# Patient Record
Sex: FEMALE | Race: BLACK OR AFRICAN AMERICAN
[De-identification: names, ages, dates, MRNs, and addresses within clinical notes are randomized per-mention and may not be internally consistent; named-entity substitution may affect disease eponyms.]

---

## 2017-01-22 ENCOUNTER — HOSPITAL ENCOUNTER (INPATIENT)
Dept: HOSPITAL 74 - JER | Age: 47
LOS: 4 days | Discharge: HOME | DRG: 552 | End: 2017-01-26
Attending: INTERNAL MEDICINE | Admitting: INTERNAL MEDICINE
Payer: COMMERCIAL

## 2017-01-22 VITALS — BODY MASS INDEX: 31.7 KG/M2

## 2017-01-22 DIAGNOSIS — M51.26: Primary | ICD-10-CM

## 2017-01-22 DIAGNOSIS — E66.8: ICD-10-CM

## 2017-01-22 DIAGNOSIS — R26.2: ICD-10-CM

## 2017-01-22 DIAGNOSIS — M47.896: ICD-10-CM

## 2017-01-22 DIAGNOSIS — E11.9: ICD-10-CM

## 2017-01-22 DIAGNOSIS — I10: ICD-10-CM

## 2017-01-22 DIAGNOSIS — Z71.3: ICD-10-CM

## 2017-01-22 DIAGNOSIS — M54.16: ICD-10-CM

## 2017-01-22 LAB
BASOPHILS # BLD: 0.9 % (ref 0–2)
DEPRECATED RDW RBC AUTO: 18.6 % (ref 11.6–15.6)
EOSINOPHIL # BLD: 2.2 % (ref 0–4.5)
MCH RBC QN AUTO: 21.6 PG (ref 25.7–33.7)
MCHC RBC AUTO-ENTMCNC: 30.1 G/DL (ref 32–36)
MCV RBC: 71.6 FL (ref 80–96)
NEUTROPHILS # BLD: 67.6 % (ref 42.8–82.8)
PLATELET # BLD AUTO: 297 K/MM3 (ref 134–434)
PMV BLD: 10.3 FL (ref 7.5–11.1)
WBC # BLD AUTO: 11.7 K/MM3 (ref 4–10)

## 2017-01-22 PROCEDURE — G0378 HOSPITAL OBSERVATION PER HR: HCPCS

## 2017-01-22 NOTE — PDOC
History of Present Illness





- General


History Source: Patient





- History of Present Illness


Initial Comments: 





01/22/17 23:14


The patient is a 46 year old female with a significant past medical history of 

DM, HTN, kidney stones who is arrived to the emergency department via EMS with 

complaints of severe lower back pain radiating to lateral aspect of right thigh 

since 8pm tonight. Pt states that she started to experience a severe mid lower 

back pain, when she bent over in shower to wash her legs. Pt is able to move 

her legs, but she reports pain when lifting right leg. She denies any weakness, 

numbness, or tingling. She denies any urinary symptoms.


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


She denies fever, chills, abdominal pain, nausea, vomiting, diarrhea, chest pain

, SOB, cough, wheezing.


PCP: Dr. Maribel Suarez





<Codi Sorto - Last Filed: 01/23/17 02:01>





<Nargis Coy - Last Filed: 01/23/17 06:52>





- General


Chief Complaint: Back Pain


Stated Complaint: BACK PAIN


Time Seen by Provider: 01/22/17 21:45





Past History





<Codi Sorto - Last Filed: 01/23/17 02:01>





- Past Medical History


Diabetes: Yes


HTN: Yes


Kidney Stones: Yes





- Psycho/Social/Smoking Cessation Hx


Anxiety: No


Suicidal Ideation: No


Smoking Status: No


Smoking History: Never smoked


Years of Tobacco Use: 0


Have you smoked in the past 12 months: No


Number of Cigarettes Smoked Daily: 0


Information on smoking cessation initiated: No


Hx Alcohol Use: No


Drug/Substance Use Hx: No


Substance Use Type: None





<Nargis Coy - Last Filed: 01/23/17 06:52>





- Past Medical History


Allergies/Adverse Reactions: 


 Allergies











Allergy/AdvReac Type Severity Reaction Status Date / Time


 


No Known Allergies Allergy   Verified 01/22/17 21:37











Home Medications: 


Ambulatory Orders





Metformin Xr [Glucophage Xr] 750 mg PO BID 09/23/13 


Benzonatate [Tessalon Perle -] 200 mg PO BID #20 cap 01/24/16 


Lisinopril [Prinivil -] 10 mg PO DAILY 01/24/16 











**Review of Systems





- Review of Systems


Able to Perform ROS?: Yes


Comments:: 


01/22/17 23:14


GENERAL/CONSTITUTIONAL: No: fever, chills, weakness, loss of appetite.


HEAD, EYES, EARS, NOSE AND THROAT: No: change in vision, ear pain, discharge, 

sore throat, throat swelling.


CARDIOVASCULAR: No: chest pain, lightheadedness, palpitations, syncope


RESPIRATORY: No: cough, shortness of breath, wheezing, hemoptysis, stridor.


GASTROINTESTINAL: No: nausea, vomiting, abdominal cramping, diarrhea, rectal 

bleeding, constipation. 


GENITOURINARY: No: dysuria, hematuria, frequency, urgency, flank pain.


MUSCULOSKELETAL: Yes: lower back pain, left leg pain No:  neck pain, muscle 

swelling 


SKIN AND BREASTS: No: lesions, pallor, rash or easy bruising.


NEUROLOGIC: No: headache, vertigo, paresthesias, weakness 


ENDOCRINE: No: unexplained weight gain or loss


HEMATOLOGIC/LYMPHATIC: No: anemia, easy bleeding, swelling nodes


All Other Systems: Reviewed and Negative





<Codi Sorto - Last Filed: 01/23/17 02:01>





*Physical Exam





- Vital Signs


 Last Vital Signs











Temp Pulse Resp BP Pulse Ox


 


 98.3 F   80   20   150/83   97 


 


 01/22/17 21:40  01/22/17 21:40  01/22/17 21:40  01/22/17 21:40  01/22/17 21:40














- Physical Exam


Comments: 





01/22/17 23:15


GENERAL: The patient is in no acute distress.


HEAD: Normal with no signs of trauma.


EYES: PERRLA, EOMI, sclera anicteric, conjunctiva clear.


ENT: Ears normal, nares patent, oropharynx clear without exudates.  Moist 

mucous membranes.


NECK: Normal range of motion, supple without lymphadenopathy, JVD, or masses.


LUNGS: Breath sounds equal, clear to auscultation bilaterally.  No wheezes, and 

no crackles.


HEART:Regular rate and rhythm, normal S1 and S2 without murmur, rub or gallop.


ABDOMEN: Soft, nontender, normoactive bowel sounds.  No guarding, no rebound.


EXTREMITIES: Normal range of motion, no edema.  No clubbing or cyanosis. No 

erythema, or tenderness.


NEUROLOGICAL: Cranial nerves II through XII grossly intact.  Normal speech.  No 

focal 


neurological deficits. 


MUSCULOSKELETAL: +pain in lower back when raising left leg.  no CVA tenderness


SKIN: Warm, Dry, normal turgor, no rashes or lesions noted.





<Codi Sorto - Last Filed: 01/23/17 02:01>





- Vital Signs


 Last Vital Signs











Temp Pulse Resp BP Pulse Ox


 


 98.3 F   80   20   150/83   97 


 


 01/22/17 21:40  01/22/17 21:40  01/22/17 21:40  01/22/17 21:40  01/22/17 21:40














<Nargis Coy - Last Filed: 01/23/17 06:52>





ED Treatment Course





- LABORATORY


CBC & Chemistry Diagram: 


 01/22/17 23:34





 01/22/17 23:34





<Codi Sorto - Last Filed: 01/23/17 02:01>





- LABORATORY


CBC & Chemistry Diagram: 


 01/22/17 23:34





 01/22/17 23:34





- RADIOLOGY


Radiology Studies Ordered: 














 Category Date Time Status


 


 LUMBAR SPINE CT W/O CONTRAST [CT] Stat CT Scan  01/22/17 22:12 Ordered














<Nargis Coy - Last Filed: 01/23/17 06:52>





Medical Decision Making





- Medical Decision Making


01/23/17 01:53


Dr. Lara covering for Dr. Maribel Bynum was paged at [phone] 





01/23/17 02:01


case was discussed with Dr. Lara





<Codi Sorto - Last Filed: 01/23/17 02:01>





- Medical Decision Making


01/23/17 01:48


Patient Name: Bao Pelletier


THIS IS A PRELIMINARYREPORT FROM IMAGING ON CALL


EXAM: CT lumbar spine without contrast


IMAGES: 1057


EXAM DATE AND TIME: 2017-01-23 00:49:02.0


REASON FOR EXAM: Low back pain radiating to right thigh


COMPARISON: No


FINDINGS:


The lumbar vertebrae are normally aligned. No fracture.


Slight erosive changes of the inferior L4 and superior L5 endplate. May be 


degenerative in nature but followup recommended to make sure there is no 


infection. MR would be sensitive for this. No other appreciable bony 


degenerative changes.


Please note that large body habitus degrades the images to the point where it 


evaluation of the discs is not optimal. MRI would be more sensitive for disc 


evaluation..


At L4-5, there is a probable left paramedian disc herniation.


Mild hypertrophic changes left sacroiliac joint.


7.6 mm right renal stone is noted.


THIS DOCUMENT HAS BEEN ELECTRONICALLY SIGNED








01/23/17 06:51


Pt comes with a herniated disk after she injured herself in the shower tonight.

  She will be admitted, as she cannot ambulate secondary to the pain and she 

will likely require neuro consult and MRI in the AM.





<Nargis Coy - Last Filed: 01/23/17 06:52>





*DC/Admit/Observation/Transfer





- Attestations


Scribe Attestion: 





01/22/17 23:16





Documentation prepared by Codi Sorto, acting as medical scribe for Nargis Coy MD.





<Codi Sorto - Last Filed: 01/23/17 02:01>





- Discharge Dispostion


Admit: Yes





<Nargis Coy - Last Filed: 01/23/17 06:52>


Diagnosis at time of Disposition: 


 Herniated disc, Inability to walk





- Referrals

## 2017-01-23 LAB
ALBUMIN SERPL-MCNC: 3.8 G/DL (ref 3.4–5)
ALP SERPL-CCNC: 59 U/L (ref 45–117)
ALT SERPL-CCNC: 35 U/L (ref 12–78)
ANION GAP SERPL CALC-SCNC: 10 MMOL/L (ref 8–16)
ANISOCYTOSIS BLD QL SMEAR: (no result)
APPEARANCE UR: CLEAR
AST SERPL-CCNC: 31 U/L (ref 15–37)
BILIRUB SERPL-MCNC: 0.4 MG/DL (ref 0.2–1)
BILIRUB UR STRIP.AUTO-MCNC: NEGATIVE MG/DL
CALCIUM SERPL-MCNC: 8.9 MG/DL (ref 8.5–10.1)
CO2 SERPL-SCNC: 25 MMOL/L (ref 21–32)
COLOR UR: (no result)
CREAT SERPL-MCNC: 1 MG/DL (ref 0.55–1.02)
GLUCOSE SERPL-MCNC: 87 MG/DL (ref 74–106)
INR BLD: 1.1 (ref 0.82–1.09)
KETONES UR QL STRIP: NEGATIVE
LEUKOCYTE ESTERASE UR QL STRIP.AUTO: NEGATIVE
MACROCYTES BLD QL SMEAR: (no result)
NITRITE UR QL STRIP: NEGATIVE
PH UR: 6 [PH] (ref 5–8)
PLATELET # BLD EST: ADEQUATE 10*3/UL
PROT SERPL-MCNC: 8.1 G/DL (ref 6.4–8.2)
PROT UR QL STRIP: NEGATIVE
PROT UR QL STRIP: NEGATIVE
PT PNL PPP: 12.1 SEC (ref 9.98–11.88)
RBC # UR STRIP: NEGATIVE /UL
SP GR UR: 1.01 (ref 1–1.03)
UROBILINOGEN UR STRIP-MCNC: NEGATIVE E.U./DL (ref 0.2–1)

## 2017-01-23 RX ADMIN — CYCLOBENZAPRINE HYDROCHLORIDE SCH MG: 10 TABLET, FILM COATED ORAL at 10:06

## 2017-01-23 RX ADMIN — VALSARTAN SCH MG: 80 TABLET, FILM COATED ORAL at 10:06

## 2017-01-23 RX ADMIN — KETOROLAC TROMETHAMINE PRN MG: 15 INJECTION, SOLUTION INTRAMUSCULAR; INTRAVENOUS at 18:11

## 2017-01-23 RX ADMIN — CYCLOBENZAPRINE HYDROCHLORIDE SCH MG: 10 TABLET, FILM COATED ORAL at 23:27

## 2017-01-23 RX ADMIN — KETOROLAC TROMETHAMINE PRN MG: 15 INJECTION, SOLUTION INTRAMUSCULAR; INTRAVENOUS at 08:52

## 2017-01-23 RX ADMIN — INSULIN ASPART SCH: 100 INJECTION, SOLUTION INTRAVENOUS; SUBCUTANEOUS at 17:02

## 2017-01-23 NOTE — CONSULT
Consult - text type





- Consultation


Consultation Note: 


CC: Low back pain 





HPI: This is a 46 y woman who was admitted with an acute onset lower back pain. 

Her pain started after she was bending forward to lift an object. She denies 

any prior lower back pain. The pain is worse with bending forward and turning 

in bed. It limits her activity and sleep. \





PMH: DM, HTN, Nephrolithiasis





Radiology: Transitional anatomy as S1/S2 with L5/S1 being the lowest movable 

segment. Type II Modic changes at L5/S1 with mild effacement  with left S1 

lateral recess. 





A: 


1. Low back pain


2. Lumbar discogenic pain 





P:


1. I explained the MRI findings and the treatment plan to her. 


2. Schedule for Right L4, L5 TFESI tomorrow. I will coordinate it with the OR 

staff as it is going to be an add-on case. 


3. NPO tonight. 


4. Continue current mgmt for now. 


5. Thank you for the consult.

## 2017-01-23 NOTE — HP
Admitting History and Physical





- Primary Care Physician


PCP: Maribel Bynum





- Smoking History


Smoking history: Never smoked


Have you smoked in the past 12 months: No


Aproximately how many cigarettes per day: 0





- Alcohol/Substance Use


Hx Alcohol Use: No





<Sherin Lara - Last Filed: 01/23/17 08:15>





- Primary Care Physician


PCP: Maribel Bynum





- Admission


Chief Complaint: Back pain


History of Present Illness: 


The patient is a 46 year old female with a significant past medical history of 

DM, HTN, kidney stones who arrived to the emergency department via EMS with 

complaints of severe lower back pain radiating to lateral aspect of right thigh 

since 8pm tonight. Pt states that she started to experience a severe mid lower 

back pain, when she bent over in shower to wash her legs. Pt is able to move 

her legs, but she reports pain when lifting right leg. She denies any weakness, 

numbness, or tingling. She denies any urinary symptoms.





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





She denies fever, chills, abdominal pain, nausea, vomiting, diarrhea, chest pain

, SOB, cough, wheezing.





CT Scan done in ED showed possible L4-L5 disc herniation. 


Patient seen today in the ED.


Continues to have back pain.


Unable to walk.


Denies urinary or bowel trouble.


No headache.


No dizziness.


Denies any head trauma.


Denies any chest pain.








History Source: Patient


Limitations to Obtaining History: No Limitations





- Past Medical History


Cardiovascular: Yes: HTN


Endocrine: Yes: Diabetes Mellitus


Additional Past Medical History: 


obesity





<Danitza Pérez - Last Filed: 01/23/17 09:57>





Home Medications





<Sherin Lara - Last Filed: 01/23/17 08:15>





<Danitza Pérez - Last Filed: 01/23/17 09:57>





- Allergies


Allergies/Adverse Reactions: 


 Allergies











Allergy/AdvReac Type Severity Reaction Status Date / Time


 


No Known Allergies Allergy   Verified 01/22/17 21:37














- Home Medications


Home Medications: 


Ambulatory Orders





Metformin Xr [Glucophage Xr] 750 mg PO BID 09/23/13 


Benzonatate [Tessalon Perle -] 200 mg PO BID #20 cap 01/24/16 


Lisinopril [Prinivil -] 10 mg PO DAILY 01/24/16 











Family Disease History





- Family Disease History


Family History: Unremarkable





<Daintza Pérez - Last Filed: 01/23/17 09:57>





Review of Systems


Unable to obtain ROS, reason: See HPI





<Danitza Pérez - Last Filed: 01/23/17 09:57>





Physical Examination


Vital Signs: 


 Vital Signs











Temperature  98.2 F   01/23/17 06:28


 


Pulse Rate  67   01/23/17 06:28


 


Respiratory Rate  16   01/23/17 06:28


 


Blood Pressure  122/59   01/23/17 06:28


 


O2 Sat by Pulse Oximetry (%)  99   01/23/17 06:28














<LaraCinthyablainebrenda - Last Filed: 01/23/17 08:15>


Vital Signs: 


 Vital Signs











Temperature  98.2 F   01/23/17 06:28


 


Pulse Rate  67   01/23/17 06:28


 


Respiratory Rate  16   01/23/17 06:28


 


Blood Pressure  122/59   01/23/17 06:28


 


O2 Sat by Pulse Oximetry (%)  99   01/23/17 07:40











Constitutional: Yes: Mild Distress


Eyes: Yes: Conjunctiva Clear


HENT: Yes: WNL


Neck: Yes: Supple


Cardiovascular: Yes: Regular Rate and Rhythm


Respiratory: Yes: CTA Bilaterally


Gastrointestinal: Yes: Soft


Musculoskeletal: Yes: Back Pain, Other (Straight leg raising test positve at 70 

degrees.  DTR's within normal limit.)


Edema: No


Peripheral Pulses WNL: Yes


Neurological: Yes: Alert





<Danitza Pérez - Last Filed: 01/23/17 09:57>





Imaging





- Results


Cat Scan: Report Reviewed





<Danitza Pérez - Last Filed: 01/23/17 09:57>





Problem List





- Problems


(1) Herniated disc


Code(s): [id number] -    





(2) Inability to walk


Code(s): R26.2 - DIFFICULTY IN WALKING, NOT ELSEWHERE CLASSIFIED





(3) Hypertension


Code(s): I10 - ESSENTIAL (PRIMARY) HYPERTENSION   





(4) Diabetes


Code(s): E11.9 - TYPE 2 DIABETES MELLITUS WITHOUT COMPLICATIONS   





(5) Obesity


Code(s): E66.9 - OBESITY, UNSPECIFIED   








<Danitza Pérez - Last Filed: 01/23/17 09:57>





Assessment/Plan


Pain control.


CT scan reviewed.


Will order MRI of LS spine.


Add Flexeril.


Ice packs.


Physical therapy.


DVT Ppx.


Monitor BG and BP.


Weight reduction stressed.


Will follow.











Documentation prepared by Danitza Pérez, acting as a medical scribe for 

Sherin Lara MD.





<Danitza Pérez - Last Filed: 01/23/17 09:57>

## 2017-01-24 PROCEDURE — 3E0R33Z INTRODUCTION OF ANTI-INFLAMMATORY INTO SPINAL CANAL, PERCUTANEOUS APPROACH: ICD-10-PCS | Performed by: PHYSICAL MEDICINE & REHABILITATION

## 2017-01-24 RX ADMIN — INSULIN ASPART SCH: 100 INJECTION, SOLUTION INTRAVENOUS; SUBCUTANEOUS at 06:25

## 2017-01-24 RX ADMIN — KETOROLAC TROMETHAMINE PRN MG: 15 INJECTION, SOLUTION INTRAMUSCULAR; INTRAVENOUS at 20:47

## 2017-01-24 RX ADMIN — INSULIN ASPART SCH: 100 INJECTION, SOLUTION INTRAVENOUS; SUBCUTANEOUS at 17:15

## 2017-01-24 RX ADMIN — CYCLOBENZAPRINE HYDROCHLORIDE SCH: 10 TABLET, FILM COATED ORAL at 08:59

## 2017-01-24 RX ADMIN — VALSARTAN SCH: 80 TABLET, FILM COATED ORAL at 08:59

## 2017-01-24 RX ADMIN — KETOROLAC TROMETHAMINE PRN MG: 15 INJECTION, SOLUTION INTRAMUSCULAR; INTRAVENOUS at 08:19

## 2017-01-24 RX ADMIN — CYCLOBENZAPRINE HYDROCHLORIDE SCH MG: 10 TABLET, FILM COATED ORAL at 21:15

## 2017-01-24 NOTE — PN
Progress Note, Physician


Chief Complaint: 


Events noted


has back pain - radiating to right leg


no numbness


unable to participate in PT due to pain 


Seen by pain management 





- Current Medication List


Current Medications: 


Active Medications





Cyclobenzaprine HCl (Flexeril -)  10 mg PO BID [facility]


   Last Admin: 01/24/17 08:59 Dose:  Not Given


Docusate Sodium (Colace -)  100 mg PO Q8H PRN


   PRN Reason: CONSTIPATION


Insulin Aspart (Novolog Vial Sliding Scale -)  1 vial SQ BIDAC THU


   PRN Reason: Protocol


   Last Admin: 01/24/17 06:25 Dose:  Not Given


Ketorolac Tromethamine (Toradol Injection -)  15 mg IVPUSH Q6H PRN


   PRN Reason: PAIN


   Stop: 01/28/17 08:09


   Last Admin: 01/24/17 08:19 Dose:  15 mg


Non-Formulary Medication (Benzonatate [Tessalon Perle -])  200 mg PO BID [facility]


Ondansetron HCl (Zofran Injection)  4 mg IVPB Q6H PRN


   PRN Reason: NAUSEA


Valsartan (Diovan -)  80 mg PO DAILY [facility]


   Last Admin: 01/24/17 08:59 Dose:  Not Given











- Objective


Vital Signs: 


 Vital Signs











Temperature  98.0 F   01/24/17 08:00


 


Pulse Rate  77   01/24/17 08:00


 


Respiratory Rate  20   01/24/17 08:00


 


Blood Pressure  133/88   01/24/17 08:00


 


O2 Sat by Pulse Oximetry (%)  96   01/24/17 08:00











Constitutional: Yes: No Distress, Calm


Cardiovascular: Yes: Regular Rate and Rhythm


Respiratory: Yes: CTA Bilaterally


Gastrointestinal: Yes: Normal Bowel Sounds, Soft, Abdomen, Obese.  No: 

Distention, Tenderness


Edema: No


Neurological: Yes: WNL


...Motor Strength: WNL


Psychiatric: Yes: Alert, Oriented


Labs: 


 INR, PTT











INR  1.10  (0.82-1.09)   01/22/17  23:34    














- ....Imaging


MRI: Report Reviewed





Problem List





- Problems


(1) Diabetes


Code(s): E11.9 - TYPE 2 DIABETES MELLITUS WITHOUT COMPLICATIONS   Qualifiers: 


     Diabetes mellitus type: type 2     Diabetes mellitus complication status: 

without complication





(2) Herniated disc


Code(s): [id number] -    Qualifiers: 


     Spinal region: lumbar        Qualified Code(s): M51.26 - Other 

intervertebral disc displacement, lumbar region  





(3) Hypertension


Code(s): I10 - ESSENTIAL (PRIMARY) HYPERTENSION   Qualifiers: 


     Hypertension type: essential hypertension        Qualified Code(s): I10 - 

Essential (primary) hypertension  





(4) Obesity


Code(s): E66.9 - OBESITY, UNSPECIFIED   Qualifiers: 


     Obesity type: due to excess calories





(5) Lumbar radiculopathy


Code(s): M54.16 - RADICULOPATHY, LUMBAR REGION








Assessment/Plan


PLAN


-- scheduled for epidural injection today


-- pain meds as needed


-- will need PT 


-- hold off Lovenox today

## 2017-01-24 NOTE — PROC
Procedure Note


Procedure: 


Procedure Date: 01/24/17





Pre-operative Diagnosis : Lumbar spondylosis, Lumbar DDD, Lumbar Radiculopathy 





Post operative Diagnosis: same 





Procedure: Right L4, L5 Transforaminal epidural steroid injections under 

fluoroscopy





Anesthesia: MAC





EBL:  0cc 





After obtaining informed consent regarding risks, benefits and alternatives of 

the procedure from the patient, the procedure was commenced.





Patient placed prone, skin cleaned with Betadine, soft tissue anesthetized with 

1% lidocaine.





22 gauge spinal needles were advanced towards the 6o clock position of the 

pedicle(s) of the above mentioned level(s) using oblique approach and 

intermittent fluoroscopy. AP and lateral visualization was used to ensure 

proper needle placement. Contrast was injected and good selective epidurogram(s

) ~noted. Solution containing 1cc of Dexamethasone (10mg/ml) and 3cc of 0.25% 

Marcaine was injected at each level.





Needles were withdrawn. Patient tolerated the procedure well and was discharged 

home.

## 2017-01-25 RX ADMIN — CYCLOBENZAPRINE HYDROCHLORIDE SCH MG: 10 TABLET, FILM COATED ORAL at 21:39

## 2017-01-25 RX ADMIN — INSULIN ASPART SCH: 100 INJECTION, SOLUTION INTRAVENOUS; SUBCUTANEOUS at 16:50

## 2017-01-25 RX ADMIN — POLYETHYLENE GLYCOL 3350 SCH GRAMS: 17 POWDER, FOR SOLUTION ORAL at 17:02

## 2017-01-25 RX ADMIN — INSULIN ASPART SCH: 100 INJECTION, SOLUTION INTRAVENOUS; SUBCUTANEOUS at 06:46

## 2017-01-25 RX ADMIN — VALSARTAN SCH MG: 80 TABLET, FILM COATED ORAL at 10:19

## 2017-01-25 RX ADMIN — CYCLOBENZAPRINE HYDROCHLORIDE SCH MG: 10 TABLET, FILM COATED ORAL at 10:19

## 2017-01-25 RX ADMIN — GABAPENTIN SCH MG: 100 CAPSULE ORAL at 21:40

## 2017-01-25 RX ADMIN — GABAPENTIN SCH MG: 100 CAPSULE ORAL at 12:43

## 2017-01-25 RX ADMIN — KETOROLAC TROMETHAMINE PRN MG: 15 INJECTION, SOLUTION INTRAMUSCULAR; INTRAVENOUS at 10:19

## 2017-01-25 NOTE — PN
Progress Note (short form)





- Note


Progress Note: 


s/p epidural injection 


Pain is not severe as before but has pressure sensation top back when she moves


She has pain radiating to right leg


No numbness or weakness


 Vital Signs - 24 hr











  01/24/17 01/24/17 01/24/17





  13:53 15:19 17:00


 


Temperature 98.6 F 98.1 F 98.1 F


 


Pulse Rate 83 80 90


 


Respiratory  20 20





Rate   


 


Blood Pressure 130/75 151/84 131/70


 


O2 Sat by Pulse  96 





Oximetry (%)   














  01/25/17 01/25/17 01/25/17





  02:00 06:00 10:26


 


Temperature  97.9 F 97.5 F L


 


Pulse Rate  84 87


 


Respiratory 20 20 18





Rate   


 


Blood Pressure  128/74 133/72


 


O2 Sat by Pulse 96  





Oximetry (%)   








 Current Medications











Generic Name Dose Route Start Last Admin





  Trade Name Freq  PRN Reason Stop Dose Admin


 


Cyclobenzaprine HCl  10 mg 01/23/17 10:00 01/25/17 10:19





  Flexeril -  PO   10 mg





  BID THU   Administration


 


Docusate Sodium  100 mg 01/23/17 08:10  





  Colace -  PO   





  Q8H PRN   





  CONSTIPATION   


 


Gabapentin  100 mg 01/25/17 12:00  





  Neurontin -  PO   





  BID THU   


 


Insulin Aspart  1 vial 01/23/17 16:30 01/25/17 06:46





  Novolog Vial Sliding Scale -  SQ   Not Given





  BIDAC [facility]   





  Protocol   


 


Ketorolac Tromethamine  15 mg 01/23/17 08:10 01/25/17 10:19





  Toradol Injection -  IVPUSH 01/28/17 08:09  15 mg





  Q6H PRN   Administration





  PAIN   


 


Ondansetron HCl  4 mg 01/23/17 08:10  





  Zofran Injection  IVPB   





  Q6H PRN   





  NAUSEA   


 


Valsartan  80 mg 01/23/17 10:00 01/25/17 10:19





  Diovan -  PO   80 mg





  DAILY THU   Administration








 Laboratory Results - last 24 hr











  01/24/17 01/25/17





  16:24 06:20


 


POC Glucometer  83  192








S1 S2 RRR


Lungs clear


No spinal tenderness


no edema








PLAN


-- Add Gabapentin


-- Physical therapy


-- continue with meds


-- continue with Flexeril 


-- dc plan for tomorrow  





Problem List





- Problems


(1) Diabetes


Code(s): E11.9 - TYPE 2 DIABETES MELLITUS WITHOUT COMPLICATIONS   Qualifiers: 


     Diabetes mellitus type: type 2     Diabetes mellitus complication status: 

without complication





(2) Herniated disc


Code(s): [id number] -    Qualifiers: 


     Spinal region: lumbar        Qualified Code(s): M51.26 - Other 

intervertebral disc displacement, lumbar region  





(3) Hypertension


Code(s): I10 - ESSENTIAL (PRIMARY) HYPERTENSION   Qualifiers: 


     Hypertension type: essential hypertension        Qualified Code(s): I10 - 

Essential (primary) hypertension  





(4) Obesity


Code(s): E66.9 - OBESITY, UNSPECIFIED   Qualifiers: 


     Obesity type: due to excess calories





(5) Lumbar radiculopathy


Code(s): M54.16 - RADICULOPATHY, LUMBAR REGION

## 2017-01-26 VITALS — HEART RATE: 77 BPM

## 2017-01-26 VITALS — DIASTOLIC BLOOD PRESSURE: 69 MMHG | SYSTOLIC BLOOD PRESSURE: 122 MMHG | TEMPERATURE: 98.4 F

## 2017-01-26 RX ADMIN — CYCLOBENZAPRINE HYDROCHLORIDE SCH MG: 10 TABLET, FILM COATED ORAL at 10:01

## 2017-01-26 RX ADMIN — POLYETHYLENE GLYCOL 3350 SCH: 17 POWDER, FOR SOLUTION ORAL at 10:01

## 2017-01-26 RX ADMIN — INSULIN ASPART SCH: 100 INJECTION, SOLUTION INTRAVENOUS; SUBCUTANEOUS at 06:23

## 2017-01-26 RX ADMIN — GABAPENTIN SCH MG: 100 CAPSULE ORAL at 10:01

## 2017-01-26 RX ADMIN — VALSARTAN SCH MG: 80 TABLET, FILM COATED ORAL at 10:01

## 2017-01-26 NOTE — DS
Physical Examination


Vital Signs: 


 Vital Signs











Temperature  98.2 F   01/26/17 06:00


 


Pulse Rate  74   01/26/17 06:00


 


Respiratory Rate  20   01/26/17 06:00


 


Blood Pressure  133/79   01/26/17 06:00


 


O2 Sat by Pulse Oximetry (%)  96   01/25/17 10:00











Constitutional: Yes: No Distress, Calm


Cardiovascular: Yes: Regular Rate and Rhythm


Respiratory: Yes: CTA Bilaterally


Gastrointestinal: Yes: Normal Bowel Sounds, Soft, Abdomen, Obese.  No: 

Distention, Tenderness


Edema: No


Neurological: Yes: WNL, Alert, Oriented


...Motor Strength: WNL


Psychiatric: Yes: Alert, Oriented


Labs: 


 Laboratory Last Values











WBC  11.7 K/mm3 (4.0-10.0)  H  01/22/17  23:34    


 


RBC  4.67 M/mm3 (3.60-5.2)   01/22/17  23:34    


 


Hgb  10.1 GM/dL (10.7-15.3)  L  01/22/17  23:34    


 


Hct  33.5 % (32.4-45.2)   01/22/17  23:34    


 


MCV  71.6 fl (80-96)  L  01/22/17  23:34    


 


MCHC  30.1 g/dl (32.0-36.0)  L  01/22/17  23:34    


 


RDW  18.6 % (11.6-15.6)  H  01/22/17  23:34    


 


Plt Count  297 K/MM3 (134-434)   01/22/17  23:34    


 


MPV  10.3 fl (7.5-11.1)   01/22/17  23:34    


 


Neutrophils %  67.6 % (42.8-82.8)   01/22/17  23:34    


 


Lymphocytes %  22.3 % (8-40)   01/22/17  23:34    


 


Monocytes %  7.0 % (3.8-10.2)   01/22/17  23:34    


 


Eosinophils %  2.2 % (0-4.5)   01/22/17  23:34    


 


Basophils %  0.9 % (0-2.0)   01/22/17  23:34    


 


Platelet Estimate  Adequate  (NORMAL)   01/22/17  23:34    


 


Platelet Comment  Few large plts   01/22/17  23:34    


 


Anisocytosis  2+   01/22/17  23:34    


 


Macrocytosis  1+   01/22/17  23:34    


 


INR  1.10  (0.82-1.09)   01/22/17  23:34    


 


Sodium  138 mmol/L (136-145)   01/22/17  23:34    


 


Potassium  4.1 mmol/L (3.5-5.1)   01/22/17  23:34    


 


Chloride  103 mmol/L ()   01/22/17  23:34    


 


Carbon Dioxide  25 mmol/L (21-32)   01/22/17  23:34    


 


Anion Gap  10  (8-16)   01/22/17  23:34    


 


BUN  11 mg/dL (7-18)   01/22/17  23:34    


 


Creatinine  1.0 mg/dL (0.55-1.02)   01/22/17  23:34    


 


Creat Clearance w eGFR  59.18  (>60)   01/22/17  23:34    


 


POC Glucometer  125 UNITS (())   01/26/17  06:21    


 


Random Glucose  87 mg/dL ()   01/22/17  23:34    


 


Calcium  8.9 mg/dL (8.5-10.1)   01/22/17  23:34    


 


Total Bilirubin  0.4 mg/dL (0.2-1.0)   01/22/17  23:34    


 


AST  31 U/L (15-37)   01/22/17  23:34    


 


ALT  35 U/L (12-78)   01/22/17  23:34    


 


Alkaline Phosphatase  59 U/L ()   01/22/17  23:34    


 


Total Protein  8.1 g/dl (6.4-8.2)   01/22/17  23:34    


 


Albumin  3.8 g/dl (3.4-5.0)   01/22/17  23:34    


 


Urine Color  Ltyellow   01/23/17  00:00    


 


Urine Appearance  Clear   01/23/17  00:00    


 


Urine pH  6.0  (5.0-8.0)   01/23/17  00:00    


 


Ur Specific Gravity  1.014  (1.001-1.035)   01/23/17  00:00    


 


Urine Protein  Negative  (NEGATIVE)   01/23/17  00:00    


 


Urine Glucose (UA)  Negative  (NEGATIVE)   01/23/17  00:00    


 


Urine Ketones  Negative  (NEGATIVE)   01/23/17  00:00    


 


Urine Blood  Negative  (NEGATIVE)   01/23/17  00:00    


 


Urine Nitrite  Negative  (NEGATIVE)   01/23/17  00:00    


 


Urine Bilirubin  Negative  (NEGATIVE)   01/23/17  00:00    


 


Urine Urobilinogen  Negative E.U./dl (0.2-1.0)   01/23/17  00:00    


 


Ur Leukocyte Esterase  Negative  (NEGATIVE)   01/23/17  00:00    


 


Urine HCG, Qual  Negative   01/23/17  00:00    














Discharge Summary


Reason For Visit: LUMBAR RAD


Current Active Problems





Diabetes (Acute) 


Herniated disc (Acute) 


Hypertension (Acute) 


Inability to walk (Acute) 


Lumbar radiculopathy (Acute) 


Obesity (Acute) 








Hospital Course: 


ER HISTORY 


- Admission


Chief Complaint: Back pain


History of Present Illness: 


The patient is a 46 year old female with a significant past medical history of 

DM, HTN, kidney stones who arrived to the emergency department via EMS with 

complaints of severe lower back pain radiating to lateral aspect of right thigh 

since 8pm tonight. Pt states that she started to experience a severe mid lower 

back pain, when she bent over in shower to wash her legs. Pt is able to move 

her legs, but she reports pain when lifting right leg. She denies any weakness, 

numbness, or tingling. She denies any urinary symptoms.





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





She denies fever, chills, abdominal pain, nausea, vomiting, diarrhea, chest pain

, SOB, cough, wheezing.





CT Scan done in ED showed possible L4-L5 disc herniation. 


Patient seen today in the ED.


Continues to have back pain.


Unable to walk.


Denies urinary or bowel trouble.


No headache.


No dizziness.


Denies any head trauma.


Denies any chest pain.





HOSPITALIZATION COURSE





Pt had MRI LS spine done which showed spondylosis, Degenerative disc disease 

and disc herniation-- was seen by pain management- Dr Houston and had epidural 

injection placed on 1/24/17.


Feeling better but has pressure sensation when she moves , she will need 

physical therapy after discharge. Advised to avoid heavy lifting, pushing and 

pulling. 


She is stable for [facility] 


Condition: Good





- Instructions


Diet, Activity, Other Instructions: 


Pt advised to stay home from work for the next three weeks- in the meantime she 

will be undergoing physical therapy and following up with Dr Houston- pain 

management. She should see me for clearance to return back to work. Avoid heavy 

lifting, pushing and pulling. 








Referrals: 


Maribel Bynum MD [Primary Care Provider] - 3 Weeks


Nico Houston MD [Staff Physician] - 2 Weeks


dr lizabeth [Other] (Physical therapy )


[facility] [Other] (physical therapy )


ike estrada [Other] (physical therpay )


Disposition: HOME





- Home Medications


Comprehensive Discharge Medication List: 


Ambulatory Orders





Metformin Xr [Glucophage Xr -] 750 mg PO BID 09/23/13 


Benzonatate [Tessalon Perle -] 200 mg PO BID #20 cap 01/24/16 


Valsartan [Diovan] 80 mg PO DAILY 01/23/17 


Cyclobenzaprine HCl [Flexeril -] 10 mg PO DAILY #14 tablet 01/26/17 


Docusate Sodium [Colace -] 100 mg PO Q8H PRN #30 capsule 01/26/17 


Gabapentin [Neurontin -] 100 mg PO DAILY #14 capsule 01/26/17 


Ketorolac Tromethamine [Toradol -] 10 mg PO Q6HPO PRN #30 tablet 01/26/17 


Polyethylene Glycol 3350 [Miralax 119 gm Btl -] 17 gm PO DAILY #1 bottle 01/26/ 17

## 2017-04-08 ENCOUNTER — HOSPITAL ENCOUNTER (EMERGENCY)
Dept: HOSPITAL 74 - JER | Age: 47
Discharge: HOME | End: 2017-04-08
Payer: COMMERCIAL

## 2017-04-08 VITALS — HEART RATE: 85 BPM | SYSTOLIC BLOOD PRESSURE: 110 MMHG | TEMPERATURE: 98.3 F | DIASTOLIC BLOOD PRESSURE: 74 MMHG

## 2017-04-08 VITALS — BODY MASS INDEX: 36.9 KG/M2

## 2017-04-08 DIAGNOSIS — E11.9: ICD-10-CM

## 2017-04-08 DIAGNOSIS — A08.4: Primary | ICD-10-CM

## 2017-04-08 DIAGNOSIS — Z79.84: ICD-10-CM

## 2017-04-08 DIAGNOSIS — B97.89: ICD-10-CM

## 2017-04-08 DIAGNOSIS — I10: ICD-10-CM

## 2017-04-08 LAB
ALBUMIN SERPL-MCNC: 3.7 G/DL (ref 3.4–5)
ALP SERPL-CCNC: 68 U/L (ref 45–117)
ALT SERPL-CCNC: 32 U/L (ref 12–78)
ANION GAP SERPL CALC-SCNC: 11 MMOL/L (ref 8–16)
ANISOCYTOSIS BLD QL SMEAR: (no result)
AST SERPL-CCNC: 21 U/L (ref 15–37)
BASOPHILS # BLD: 0.5 % (ref 0–2)
BILIRUB SERPL-MCNC: 0.4 MG/DL (ref 0.2–1)
CALCIUM SERPL-MCNC: 9.4 MG/DL (ref 8.5–10.1)
CO2 SERPL-SCNC: 24 MMOL/L (ref 21–32)
COCKROFT - GAULT: 123.16
CREAT SERPL-MCNC: 1 MG/DL (ref 0.55–1.02)
DEPRECATED RDW RBC AUTO: 19.4 % (ref 11.6–15.6)
EOSINOPHIL # BLD: 1.4 % (ref 0–4.5)
GLUCOSE SERPL-MCNC: 128 MG/DL (ref 74–106)
HYPOCHROMIA BLD QL SMEAR: (no result)
MAGNESIUM SERPL-MCNC: 2 MG/DL (ref 1.8–2.4)
MCH RBC QN AUTO: 20.5 PG (ref 25.7–33.7)
MCHC RBC AUTO-ENTMCNC: 30.9 G/DL (ref 32–36)
MCV RBC: 66.3 FL (ref 80–96)
MICROCYTES BLD QL SMEAR: (no result)
NEUTROPHILS # BLD: 74.2 % (ref 42.8–82.8)
PLATELET # BLD AUTO: 293 K/MM3 (ref 134–434)
PLATELET # BLD EST: ADEQUATE 10*3/UL
PLATELET COMMENT2: (no result)
PMV BLD: 10.2 FL (ref 7.5–11.1)
POIKILOCYTOSIS BLD QL SMEAR: (no result)
POLYCHROMASIA BLD QL SMEAR: (no result)
PROT SERPL-MCNC: 8.4 G/DL (ref 6.4–8.2)
WBC # BLD AUTO: 10.8 K/MM3 (ref 4–10)

## 2017-04-08 PROCEDURE — 3E033GC INTRODUCTION OF OTHER THERAPEUTIC SUBSTANCE INTO PERIPHERAL VEIN, PERCUTANEOUS APPROACH: ICD-10-PCS | Performed by: EMERGENCY MEDICINE

## 2017-04-08 PROCEDURE — 3E0337Z INTRODUCTION OF ELECTROLYTIC AND WATER BALANCE SUBSTANCE INTO PERIPHERAL VEIN, PERCUTANEOUS APPROACH: ICD-10-PCS | Performed by: EMERGENCY MEDICINE

## 2017-04-08 NOTE — PDOC
History of Present Illness





- General


History Source: Patient, Old Records


Exam Limitations: No Limitations





- History of Present Illness


Initial Comments: 


04/08/17 01:46





The patient is a 46 year old female, with a significant past medical history of 

hypertension and diabetes, who presents to the emergency department with 

multiple episodes of diarrhea since approximately 7PM yesterday evening. The 

patient was in her usual state of health yesterday morning. Since 7PM yesterday 

afternoon, the patient reports approximately 10 episodes of diarrhea. Currently 

in the ED, the patient reports some nausea but denies fever, chills or 

vomiting. The patient denies any sick contacts. 





Allergies: None reported.


Past Surgical History: Back Surgery.


Social History: Non smoker. Denies alcohol or drug use. 


PCP: Dr. Maribel Bynum 





<Theresa Goddard - Last Filed: 04/08/17 01:49>





- General


History Source: Patient


Exam Limitations: No Limitations





<Patel Arredondo - Last Filed: 04/08/17 01:50>





<Nargis Coy - Last Filed: 04/08/17 02:39>





- General


Chief Complaint: Weakness


Stated Complaint: WEAKNESS,HEADACHE,DIARRHEA


Time Seen by Provider: 04/08/17 00:41





Past History





<Theresa Goddard - Last Filed: 04/08/17 01:49>





- Past Medical History


Diabetes: Yes


HTN: Yes


Kidney Stones: Yes





- Psycho/Social/Smoking Cessation Hx


Anxiety: No


Suicidal Ideation: No


Smoking Status: No


Smoking History: Never smoked


Years of Tobacco Use: 0


Have you smoked in the past 12 months: No


Number of Cigarettes Smoked Daily: 0


Information on smoking cessation initiated: No


Hx Alcohol Use: No


Drug/Substance Use Hx: No


Substance Use Type: None





<Patel Arredondo - Last Filed: 04/08/17 01:50>





<Nargis Coy - Last Filed: 04/08/17 02:39>





- Past Medical History


Allergies/Adverse Reactions: 


 Allergies











Allergy/AdvReac Type Severity Reaction Status Date / Time


 


No Known Allergies Allergy   Verified 04/08/17 00:47











Home Medications: 


Ambulatory Orders





Metformin Xr [Glucophage Xr -] 750 mg PO BID 09/23/13 


Benzonatate [Tessalon Perle -] 200 mg PO BID #20 cap 01/24/16 


Valsartan [Diovan] 80 mg PO DAILY 01/23/17 


Cyclobenzaprine HCl [Flexeril -] 10 mg PO DAILY #14 tablet 01/26/17 


Docusate Sodium [Colace -] 100 mg PO Q8H PRN #30 capsule 01/26/17 


Gabapentin [Neurontin -] 100 mg PO DAILY #14 capsule 01/26/17 


Ketorolac Tromethamine [Toradol -] 10 mg PO Q6HPO PRN #30 tablet 01/26/17 


Polyethylene Glycol 3350 [Miralax 119 gm Btl -] 17 gm PO DAILY #1 bottle 01/26/ 17 











**Review of Systems





- Review of Systems


Able to Perform ROS?: Yes


Comments:: 


04/08/17 01:41





GENERAL/CONSTITUTIONAL: No fever or chills. No weakness.


HEAD, EYES, EARS, NOSE AND THROAT: No change in vision. No ear pain or 

discharge. No sore throat.


CARDIOVASCULAR: No chest pain or shortness of breath.


RESPIRATORY: No cough, wheezing, or hemoptysis.


GASTROINTESTINAL: +Nausea, diarrhea. No vomiting or constipation.


GENITOURINARY: No dysuria, frequency, or change in urination.


MUSCULOSKELETAL: No joint or muscle swelling or pain. No neck or back pain.


SKIN: No rash.


NEUROLOGIC: No headache, vertigo, loss of consciousness, or change in strength/

sensation.


ENDOCRINE: No increased thirst. No abnormal weight change.


HEMATOLOGIC/LYMPHATIC: No anemia, easy bleeding, or history of blood clots.


ALLERGIC/IMMUNOLOGIC: No hives or skin allergy.








<Theresa Goddard - Last Filed: 04/08/17 01:49>





*Physical Exam





- Vital Signs


 Last Vital Signs











Temp Pulse Resp BP Pulse Ox


 


 98.3 F   85   20   110/74   98 


 


 04/08/17 00:47  04/08/17 00:47  04/08/17 00:47  04/08/17 00:47  04/08/17 00:47














- Physical Exam


Comments: 


04/08/17 01:49





GENERAL: Awake, alert, and fully oriented, in no acute distress. 


HEAD: No signs of trauma. 


EYES: PERRLA, EOMI, sclera anicteric, conjunctiva clear. 


ENT:  Dry mucosa. Auricles normal inspection, hearing grossly normal, nares 

patent, oropharynx clear without exudates. 


NECK: Normal ROM, supple, no lymphadenopathy, JVD, or masses. 


LUNGS: Breath sounds equal, clear to auscultation bilaterally. No wheezes, and 

no crackles. 


HEART: Regular rate and rhythm, normal S1 and S2, no murmurs, rubs or gallops. 


ABDOMEN: Soft, nontender, normoactive bowel sounds. No guarding, no rebound. No 

masses. 


EXTREMITIES: Normal range of motion, no edema.  No clubbing or cyanosis. No 

cords, erythema, or tenderness. 


NEUROLOGICAL: Cranial nerves II through XII intact. Normal speech, normal gait. 


SKIN: Warm, dry, normal turgor, no rashes or lesions noted. 





<Theresa Goddard - Last Filed: 04/08/17 01:49>





- Vital Signs


 Last Vital Signs











Temp Pulse Resp BP Pulse Ox


 


 98.3 F   85   20   110/74   98 


 


 04/08/17 00:47  04/08/17 00:47  04/08/17 00:47  04/08/17 00:47  04/08/17 00:47














<Patel Arredondo - Last Filed: 04/08/17 01:50>





- Vital Signs


 Last Vital Signs











Temp Pulse Resp BP Pulse Ox


 


 98.3 F   85   20   110/74   98 


 


 04/08/17 00:47  04/08/17 00:47  04/08/17 00:47  04/08/17 00:47  04/08/17 00:47














<Nargis Coy - Last Filed: 04/08/17 02:39>





ED Treatment Course





- LABORATORY


CBC & Chemistry Diagram: 


 04/08/17 01:45





 04/08/17 01:45





- ADDITIONAL ORDERS


Additional order review: 


 Laboratory  Results











  04/08/17





  01:45


 


Sodium  138


 


Potassium  4.2


 


Chloride  103


 


Carbon Dioxide  24


 


Anion Gap  11


 


BUN  13  D


 


Creatinine  1.0  D


 


Creat Clearance w eGFR  59.18


 


Random Glucose  128 H D


 


Calcium  9.4


 


Magnesium  2.0


 


Total Bilirubin  0.4


 


AST  21


 


ALT  32


 


Alkaline Phosphatase  68


 


Total Protein  8.4 H


 


Albumin  3.7


 


Lipase  138








 











  04/08/17





  01:45


 


RBC  4.30


 


MCV  66.3 L


 


MCHC  30.9 L


 


RDW  19.4 H


 


MPV  10.2


 


Neutrophils %  74.2


 


Lymphocytes %  17.7  D


 


Monocytes %  6.2


 


Eosinophils %  1.4


 


Basophils %  0.5














- Medications


Given in the ED: 


ED Medications














Discontinued Medications














Generic Name Dose Route Start Last Admin





  Trade Name Josephine  PRN Reason Stop Dose Admin


 


Acetaminophen  650 mg 04/08/17 00:59 04/08/17 01:54





  Tylenol -  PO 04/08/17 01:00  650 mg





  ONCE ONE   Administration


 


Al Hydroxide/Mg Hydroxide  30 ml 04/08/17 00:59 04/08/17 01:53





  Mylanta Oral Suspension -  PO 04/08/17 01:00  30 ml





  ONCE ONE   Administration


 


Sodium Chloride  1,000 mls @ 1,000 mls/hr 04/08/17 00:59 04/08/17 01:53





  Normal Saline -  IV 04/08/17 01:58  1,000 mls/hr





  ASDIR STA   Administration


 


Loperamide HCl  4 mg 04/08/17 00:59 04/08/17 01:54





  Imodium -  PO 04/08/17 01:00  4 mg





  ONCE ONE   Administration


 


Metoclopramide HCl  10 mg 04/08/17 01:01 04/08/17 01:54





  Reglan Injection -  IVPB 04/08/17 01:02  10 mg





  ONCE ONE   Administration














<Nargis Coy - Last Filed: 04/08/17 02:39>





Medical Decision Making





- Medical Decision Making


04/08/17 01:25





A portion of this note was documented by scribe services under my direction. I 

have reviewed the details of the note, within reason, and agree with the 

documentation with the following case summary and management plan written by 

me. 





Patient treated in the ED.





Nursing notes are reviewed and incorporated into the medical decision-making.


Vital signs reviewed.





Peripheral IV access obtained by the nurse, laboratory studies are drawn and 

sent, reviewed and interpreted by myself. 





 Vital Signs











Temp Pulse Resp BP Pulse Ox


 


 98.3 F   85   20   110/74   98 


 


 04/08/17 00:47  04/08/17 00:47  04/08/17 00:47  04/08/17 00:47  04/08/17 00:47








46 year old female with past medical history of HTN, DM presents with diarrhea. 

The patient reports that she was in her usual state of health when she develop 

up to 10 times episode of diarrhea and some nausea. Denies fevers, chills. 

Reports a mild tension like headache. Has some nausea but no vomiting.





Pt likely has gastroenteritis. No abdominal pain. Labs, IVF, symptom treatment. 

If workup negative, and patient reports feeling better, can d/c.





04/08/17 01:50





Case signed out to ED attending Dr. Coy for further management and disposition.





<Patel Arredondo - Last Filed: 04/08/17 01:50>





- Medical Decision Making





04/08/17 02:37


Pt received on signout. Pt has normal labs.  SHe has been hydrated and she 

feels better. She has a Hb of 8.8, but 2 months ago it was 9.1, so her Hb is 

stable.  She will be discharged.





<Nargis Coy - Last Filed: 04/08/17 02:39>





*DC/Admit/Observation/Transfer





- Attestations


Scribe Attestion: 


04/08/17 01:40





Documentation prepared by Theresa Goddard, acting as medical scribe for Patel Arredondo MD. 





<Theresa Goddard - Last Filed: 04/08/17 01:49>





<Patel Arredondo - Last Filed: 04/08/17 01:50>





- Discharge Dispostion


Admit: No





<Nargis Coy - Last Filed: 04/08/17 02:39>


Diagnosis at time of Disposition: 


 Viral gastroenteritis





- Discharge Dispostion


Disposition: HOME


Condition at time of disposition: Improved





- Referrals


Referrals: 


Maribel Bynum MD [Primary Care Provider] - 





- Patient Instructions


Printed Discharge Instructions:  DI for Viral Gastroenteritis -- Adult

## 2019-04-02 ENCOUNTER — HOSPITAL ENCOUNTER (OUTPATIENT)
Dept: HOSPITAL 74 - JASU-ENDO | Age: 49
Discharge: HOME | End: 2019-04-02
Attending: INTERNAL MEDICINE
Payer: COMMERCIAL

## 2019-04-02 VITALS — BODY MASS INDEX: 38.2 KG/M2

## 2019-04-02 VITALS — DIASTOLIC BLOOD PRESSURE: 57 MMHG | HEART RATE: 82 BPM | SYSTOLIC BLOOD PRESSURE: 114 MMHG

## 2019-04-02 VITALS — TEMPERATURE: 97.5 F

## 2019-04-02 DIAGNOSIS — Z12.11: Primary | ICD-10-CM

## 2019-04-02 DIAGNOSIS — K64.8: ICD-10-CM

## 2019-04-02 PROCEDURE — 0DJD8ZZ INSPECTION OF LOWER INTESTINAL TRACT, VIA NATURAL OR ARTIFICIAL OPENING ENDOSCOPIC: ICD-10-PCS | Performed by: INTERNAL MEDICINE

## 2019-12-03 ENCOUNTER — HOSPITAL ENCOUNTER (OUTPATIENT)
Dept: HOSPITAL 74 - JASU-SURG | Age: 49
Discharge: HOME | End: 2019-12-03
Attending: OBSTETRICS & GYNECOLOGY
Payer: COMMERCIAL

## 2019-12-03 VITALS — HEART RATE: 73 BPM | SYSTOLIC BLOOD PRESSURE: 124 MMHG | DIASTOLIC BLOOD PRESSURE: 70 MMHG

## 2019-12-03 VITALS — TEMPERATURE: 98 F

## 2019-12-03 VITALS — BODY MASS INDEX: 36.9 KG/M2

## 2019-12-03 DIAGNOSIS — D25.0: ICD-10-CM

## 2019-12-03 DIAGNOSIS — I10: ICD-10-CM

## 2019-12-03 DIAGNOSIS — N95.0: Primary | ICD-10-CM

## 2019-12-03 DIAGNOSIS — N84.0: ICD-10-CM

## 2019-12-03 DIAGNOSIS — E11.9: ICD-10-CM

## 2019-12-03 DIAGNOSIS — Z79.84: ICD-10-CM

## 2019-12-03 PROCEDURE — 0UB98ZZ EXCISION OF UTERUS, VIA NATURAL OR ARTIFICIAL OPENING ENDOSCOPIC: ICD-10-PCS | Performed by: OBSTETRICS & GYNECOLOGY

## 2019-12-03 PROCEDURE — 0UB98ZX EXCISION OF UTERUS, VIA NATURAL OR ARTIFICIAL OPENING ENDOSCOPIC, DIAGNOSTIC: ICD-10-PCS | Performed by: OBSTETRICS & GYNECOLOGY

## 2019-12-03 PROCEDURE — 0UDB7ZX EXTRACTION OF ENDOMETRIUM, VIA NATURAL OR ARTIFICIAL OPENING, DIAGNOSTIC: ICD-10-PCS | Performed by: OBSTETRICS & GYNECOLOGY

## 2019-12-03 NOTE — OP
Operative Note





- Note:


Operative Date: 12/03/19


Pre-Operative Diagnosis: PMB, submucos myoma, EM polyp


Operation: hysteroscopy D&C , polypectomy, resection of submucos myoma


Findings: 


2 small submucos moyma, 2 EM polyp


Surgeon: Anthony Sanchez


Anesthesia: General


Specimens Removed: fibroid, EM polyp, EM curetting


Estimated Blood Loss (mls): 50


Instrument used (Debridements only): Symphions resectoscope


Blood Volume Replaced (mls): 0


Operative Report Dictated: Yes

## 2019-12-03 NOTE — HP
History & Physical Update





- Physical


Physical: No Change





- Assessment


Assessment: No Change





- Plan


Plan: No Change (H&P reviwed , no changes for hysteroscopy, D&C, resection of 

submucos myoma)

## 2019-12-03 NOTE — OP
DATE OF OPERATION:  12/03/2019

 

PREOPERATIVE DIAGNOSIS:  Postmenopausal bleeding and submucous leiomyoma and

endometrial polyp.

 

POSTOPERATIVE DIAGNOSIS:  Postmenopausal bleeding and submucous leiomyoma and

endometrial polyp.

 

PROCEDURE:  Hysteroscopy, resection of endometrial polyp and submucous myoma via

Symphion resectoscope and dilation and curettage.

 

SURGEON:  Anthony Sanchez MD 

 

ANESTHESIA:  General.

 

ESTIMATED BLOOD LOSS:  50 mL.

 

DESCRIPTION OF PROCEDURE:  Patient was taken to the operating room.  Under adequate

general anesthesia in dorsal lithotomy position, examination under anesthesia

revealed external genitalia to be normal.  Vagina was normal.  The cervix was clean. 

No gross lesion.  Uterus was irregular and prominent with fibroid.  Adnexa, no masses

were palpable.  Then with a weighted speculum in the vagina, anterior lip of cervix

was grasped with a single-tooth tenaculum.  Uterine cavity was sounded to 11 cm. 

Then cervix was slightly dilated with Hegar dilator and then Symphion resectoscope

was introduced into the uterine cavity.  Endocervical canal appeared to be normal. 

There were 2 fibroids in the posterior wall of the uterus, and there were 2 large

polyps on the mid uterine wall anteriorly.  Both cornua regions were identified, and

tubal ostia were visualized.  No other abnormalities were seen.  Then with the

Symphion resectoscope, 1st the polyp was removed then the resection of the submucous

fibroids was done then visualization of the endometrial cavity appeared to be clear

without any polyp or fibroid.  Then the contents was suctioned then uterine cavity

was curetted.  Patient tolerated procedure well.  Left the OR in good condition.

 

 

ELODIA [id number]

DD: 12/03/2019 11:07

DT: 12/03/2019 11:35

Job #:  [id number]

## 2019-12-04 NOTE — PATH
Surgical Pathology Report



Patient Name:  MARKELL TUTTLE

Accession #:  [id number]

[facility]. Rec. #:  [id number]                                                        

   /Age/Gender:  1970 (Age: 49) / F

Account:  [id number]                                                          

             Location: [facility] SURGICAL

Taken:  12/3/2019

Received:  12/3/2019

Reported:  2019

Physicians:  Anthony Sanchez M.D.

  



Specimen(s) Received

A: ENDOMETRIAL CURETTINGS 

B: POLYP &FIBROID 





Clinical History

Menometrorrhagia, fibroid uterus, endometrial polyp, submucosal fibroid







Final Diagnosis

A. ENDOMETRIAL CURETTINGS:

FRAGMENTS OF ENDOMETRIAL POLYP.

SEPARATE SECRETORY TYPE ENDOMETRIUM.

SEPARATE ENDOCERVICAL TISSUE WITH DILATED GLANDS AND UNREMARKABLE SQUAMOUS

EPITHELIUM.



B. POLYPS AND FIBROID, BIOPSY:

FRAGMENTS OF ENDOMETRIAL POLYP.

SEPARATE SMOOTH MUSCLE BUNDLES, CONSISTENT WITH SUBMUCOSA LEIOMYOMA.

SEPARATE SECRETORY TYPE ENDOMETRIUM.

ENDOCERVICAL TISSUE WITH ACUTE AND CHRONIC INFLAMMATION.





***Electronically Signed***

Tamiko Pierson M.D.





Gross Description

A. Received in formalin labeled "endometrial curettings," is a 3.0 x 3.0 x 0.4

cm aggregate of tan-red soft tissue fragments. The formalin is filtered and the

specimen is entirely submitted in 2 cassettes.



B. Received in formalin labeled "polyp and fibroid," is a 2 g, 4.0 x 3.0 x 0.3

cm aggregate of tan soft tissue fragments. The formalin is filtered and the

specimen is entirely submitted in 2 cassettes.



saudi/12/3/2019

## 2021-07-14 ENCOUNTER — HOSPITAL ENCOUNTER (OUTPATIENT)
Dept: HOSPITAL 74 - JASU-SURG | Age: 51
Discharge: HOME | End: 2021-07-14
Attending: ORTHOPAEDIC SURGERY
Payer: COMMERCIAL

## 2021-07-14 VITALS — DIASTOLIC BLOOD PRESSURE: 76 MMHG | TEMPERATURE: 97.6 F | HEART RATE: 79 BPM | SYSTOLIC BLOOD PRESSURE: 124 MMHG

## 2021-07-14 VITALS — BODY MASS INDEX: 38.4 KG/M2

## 2021-07-14 DIAGNOSIS — E11.9: ICD-10-CM

## 2021-07-14 DIAGNOSIS — M65.331: Primary | ICD-10-CM

## 2021-07-14 DIAGNOSIS — I10: ICD-10-CM

## 2021-07-14 PROCEDURE — 0LN70ZZ RELEASE RIGHT HAND TENDON, OPEN APPROACH: ICD-10-PCS | Performed by: ORTHOPAEDIC SURGERY
